# Patient Record
Sex: MALE | Race: WHITE | URBAN - METROPOLITAN AREA
[De-identification: names, ages, dates, MRNs, and addresses within clinical notes are randomized per-mention and may not be internally consistent; named-entity substitution may affect disease eponyms.]

---

## 2021-12-15 ENCOUNTER — PROCEDURE VISIT (OUTPATIENT)
Dept: CARDIOLOGY CLINIC | Age: 41
End: 2021-12-15
Payer: COMMERCIAL

## 2021-12-15 ENCOUNTER — OFFICE VISIT (OUTPATIENT)
Dept: CARDIOLOGY CLINIC | Age: 41
End: 2021-12-15
Payer: COMMERCIAL

## 2021-12-15 VITALS
HEART RATE: 58 BPM | SYSTOLIC BLOOD PRESSURE: 118 MMHG | DIASTOLIC BLOOD PRESSURE: 74 MMHG | WEIGHT: 166.4 LBS | BODY MASS INDEX: 26.74 KG/M2 | HEIGHT: 66 IN

## 2021-12-15 DIAGNOSIS — R94.31 ST ELEVATION: ICD-10-CM

## 2021-12-15 DIAGNOSIS — R00.2 PALPITATION: Primary | ICD-10-CM

## 2021-12-15 LAB
LV EF: 58 %
LVEF MODALITY: NORMAL

## 2021-12-15 PROCEDURE — 93000 ELECTROCARDIOGRAM COMPLETE: CPT | Performed by: INTERNAL MEDICINE

## 2021-12-15 PROCEDURE — 99203 OFFICE O/P NEW LOW 30 MIN: CPT | Performed by: INTERNAL MEDICINE

## 2021-12-15 PROCEDURE — 93306 TTE W/DOPPLER COMPLETE: CPT | Performed by: INTERNAL MEDICINE

## 2021-12-15 RX ORDER — DULOXETIN HYDROCHLORIDE 30 MG/1
CAPSULE, DELAYED RELEASE ORAL
COMMUNITY
Start: 2021-10-22

## 2021-12-15 RX ORDER — BALSALAZIDE DISODIUM 750 MG/1
CAPSULE ORAL
COMMUNITY
Start: 2021-08-18

## 2021-12-15 NOTE — PROGRESS NOTES
42 y.o. here for abnormal ECG (concern for abnormal ecg). Pt was getting a colonoscopy last week when his HR dropped during the exam. Had an ecg that had STEs suggestive of early repol, and he was told to f/u with a cardiologist. No cp. No sob. No n/v/lh/dizziness. No syncope. Gets occ shooting pain, random. He is a runner; runs daily 3-4 miles. No problems prior to colon. No exercise since. No prior MI. No prior stroke. No LE edema. No orthopnea. Personally, no heart history and no issues as a child. Got ECG. Saw the result the next day and emailed the doctor from [address] Response was to follow-up. Has had 4 colonoscopies; no problems ex for this one. Social Hx:  EtOH: Social  Tob: None  Drugs: Marijuana (weekly)    FH: Mother: Mark vick\"  Father: No cardiac history  -No h/o MI/Strokes in parents. No Known Allergies    Review of Systems   General: No fevers, chills, fatigue, or night sweats. No abnormal changes in weight. HEENT: No blurry or decreased vision. No changes in hearing, nasal discharge or sore throat. Cardiovascular: See HPI. No cramping in legs or buttocks when walking. Respiratory: No cough, hemoptysis, or wheezing. No history of asthma. Gastrointestinal: UC, polyps. Genito-Urinary: No dysuria or hematuria. No urgency or polyuria. Musculoskeletal: No complaints of joint pain, joint swelling or muscular weakness/soreness. Neurological: No dizziness or headaches. No numbness/tingling, speech problems or weakness. No history of a stroke or TIA. Psychological: No anxiety or depression  Hematological and Lymphatic: No abnormal bleeding or bruising, blood clots, jaundice. Endocrine: No malaise/lethargy, palpitations, polydipsia/polyuria, temperature intolerance or unexpected weight changes. Skin: No rashes or non-healing ulcers. PE:  Blood pressure 118/74, height 5' 6\" (1.676 m), weight 166 lb 6.4 oz (75.5 kg). General (appearance): Well devel.  No distress  Eyes: Anicteric. EOMI  Neck: No jvd. Supple. Ears/Nose/Mouth/Thorat: No cyanosis  CV: RRR. No sig murmurs. No r/g. Respiratory:  Clear b, normal respiratory effort  GI: abd s/nt/nd  Skin: Warm, dry. No rashes  Neuro/Psych: Alert and oriented x 3. Appropriate behavior  Ext:  No c/c. No edema  Pulses:  2+ radial/carotid. No carotid bruits    Care Everywhere Labs reviewed. 12/2021 ECG: NSR, early repol (pt provided tracing)    A/P:  42 y.o. here for concern for abnl ecg that was done with anesthesia at  last week. I reviewed tracing. Reviewed today's tracing with no sig change. I think it is early repol, a variation of normal. Very unlikely, though possible, to be pericarditis. Given that the pt is an avid exerciser, and for pt/wife peace of mind, will check echo however. Issues:  1. Palpitation    2. ST elevation      Recs:  -Echo  -No further w/u unless echo reveals something surprising. Ana Bates MD, [facility]